# Patient Record
Sex: MALE | Race: WHITE | Employment: FULL TIME | URBAN - METROPOLITAN AREA
[De-identification: names, ages, dates, MRNs, and addresses within clinical notes are randomized per-mention and may not be internally consistent; named-entity substitution may affect disease eponyms.]

---

## 2019-09-18 ENCOUNTER — OFFICE VISIT (OUTPATIENT)
Dept: FAMILY MEDICINE | Facility: CLINIC | Age: 39
End: 2019-09-18
Payer: COMMERCIAL

## 2019-09-18 VITALS
DIASTOLIC BLOOD PRESSURE: 66 MMHG | BODY MASS INDEX: 26.01 KG/M2 | SYSTOLIC BLOOD PRESSURE: 108 MMHG | RESPIRATION RATE: 16 BRPM | WEIGHT: 175.6 LBS | HEART RATE: 75 BPM | HEIGHT: 69 IN | TEMPERATURE: 98.1 F | OXYGEN SATURATION: 98 %

## 2019-09-18 DIAGNOSIS — M25.571 PAIN IN JOINT INVOLVING ANKLE AND FOOT, RIGHT: Primary | ICD-10-CM

## 2019-09-18 DIAGNOSIS — M75.81 TENDINITIS OF RIGHT ROTATOR CUFF: ICD-10-CM

## 2019-09-18 PROCEDURE — 99203 OFFICE O/P NEW LOW 30 MIN: CPT | Performed by: FAMILY MEDICINE

## 2019-09-18 ASSESSMENT — MIFFLIN-ST. JEOR: SCORE: 1713.25

## 2019-09-18 NOTE — PROGRESS NOTES
Subjective     Guy Carrera is a 38 year old male who presents to clinic today for the following health issues:    HPI   Musculoskeletal problem/pain- has had problems with his right shoulder for months after a fall last yr when he landed on that side , he had done some exercises which have helped but recently started to notice pain in that shoulder again , ayla with certain movements , like when raising his right arm up and behind his head .  2) pain and discomfort in the right foot as he slipped a coupe of months ago and injured it , was painful for a while , then got better and now seems that he has pain when trying to stretch that foot . No redness and no edema currently and he is able to bear weight .      Duration: 1 year ongoing    Description  Location: right shoulder    Intensity:  moderate    Accompanying signs and symptoms: patient feels pain, when he puts his hands behind his head, some movement    History  Previous similar problem: no   Previous evaluation:  none    Precipitating or alleviating factors:  Trauma or overuse: YES- fell on his arm, couldn't move it  Aggravating factors include: any movements    Therapies tried and outcome: exercises some relief        There is no problem list on file for this patient.    No past surgical history on file.    Social History     Tobacco Use     Smoking status: Not on file   Substance Use Topics     Alcohol use: Not on file     No family history on file.      No current outpatient medications on file.     No Known Allergies  No lab results found.   BP Readings from Last 3 Encounters:   09/18/19 108/66    Wt Readings from Last 3 Encounters:   09/18/19 79.7 kg (175 lb 9.6 oz)                    Reviewed and updated as needed this visit by Provider         Review of Systems   ROS COMP: Constitutional, HEENT, cardiovascular, pulmonary, GI, , musculoskeletal, neuro, skin, endocrine and psych systems are negative, except as otherwise noted.      Objective     There were no vitals taken for this visit.  There is no height or weight on file to calculate BMI.  Physical Exam   GENERAL: healthy, alert and no distress  EYES: Eyes grossly normal to inspection, PERRL and conjunctivae and sclerae normal  MS: no gross musculoskeletal defects noted, no edema, some tenderness in the right shoulder with abduction and internal rotation , also right foot some pain with plantar flexion but there eis no ankle edema and no erythema,     Diagnostic Test Results:  Labs reviewed in Epic  none         Assessment & Plan     1. Pain in joint involving ankle and foot, right  We discussed doing PT but I have referred him to do them after seeing a sports medicine specialist .  - SPORTS MEDICINE REFERRAL    2. Tendinitis of right rotator cuff  As above  I have referred him to Sports medicine specialist   - SPORTS MEDICINE REFERRAL   ice, rest , NSAIDs as needed .  RTC if no improving or worsening.  Pt is aware  and comfortable with the current plan.      Bethany Thomas MD  [facility]

## 2019-09-18 NOTE — NURSING NOTE
"Chief Complaint   Patient presents with     Musculoskeletal Problem     /66   Pulse 75   Temp 98.1  F (36.7  C) (Oral)   Resp 16   Ht 1.763 m (5' 9.4\")   Wt 79.7 kg (175 lb 9.6 oz)   SpO2 98%   BMI 25.63 kg/m   Estimated body mass index is 25.63 kg/m  as calculated from the following:    Height as of this encounter: 1.763 m (5' 9.4\").    Weight as of this encounter: 79.7 kg (175 lb 9.6 oz).  bp completed using cuff size: regular      Health Maintenance addressed:  NONE    n/a    Veronique Fuentes MA    "

## 2019-09-25 ENCOUNTER — ANCILLARY PROCEDURE (OUTPATIENT)
Dept: GENERAL RADIOLOGY | Facility: CLINIC | Age: 39
End: 2019-09-25
Attending: FAMILY MEDICINE
Payer: COMMERCIAL

## 2019-09-25 ENCOUNTER — OFFICE VISIT (OUTPATIENT)
Dept: ORTHOPEDICS | Facility: CLINIC | Age: 39
End: 2019-09-25
Payer: COMMERCIAL

## 2019-09-25 VITALS
DIASTOLIC BLOOD PRESSURE: 67 MMHG | WEIGHT: 175 LBS | SYSTOLIC BLOOD PRESSURE: 107 MMHG | BODY MASS INDEX: 25.92 KG/M2 | HEIGHT: 69 IN

## 2019-09-25 DIAGNOSIS — S43.001A SHOULDER SUBLUXATION, RIGHT, INITIAL ENCOUNTER: ICD-10-CM

## 2019-09-25 DIAGNOSIS — M25.511 RIGHT SHOULDER PAIN, UNSPECIFIED CHRONICITY: ICD-10-CM

## 2019-09-25 DIAGNOSIS — M25.511 RIGHT SHOULDER PAIN, UNSPECIFIED CHRONICITY: Primary | ICD-10-CM

## 2019-09-25 DIAGNOSIS — S93.411A SPRAIN OF CALCANEOFIBULAR LIGAMENT OF RIGHT ANKLE, INITIAL ENCOUNTER: ICD-10-CM

## 2019-09-25 PROCEDURE — 99204 OFFICE O/P NEW MOD 45 MIN: CPT | Performed by: FAMILY MEDICINE

## 2019-09-25 PROCEDURE — 73030 X-RAY EXAM OF SHOULDER: CPT | Mod: RT

## 2019-09-25 ASSESSMENT — MIFFLIN-ST. JEOR: SCORE: 1710.52

## 2019-09-25 NOTE — PROGRESS NOTES
"[facility]   Clinic Visit s Sep 25, 2019    PCP: Guillermina, Leslye [place]      Guy is a 38 year old male who is seen as self referral for   Chief Complaint   Patient presents with     Right Shoulder - Pain       Injury: Patient describes injury as slipped while working last year 2018. He did some informal shoulder exercises on his own but he has noticed worsening pain and ROM     Right hand dominant    Location of Pain: right shoulder posterior, nonradiating   Duration of Pain: initial injury 1 year ago, worse in the last 1 month(s),   Rating of Pain at worst: 7/10  Rating of Pain Currently: 3/10  Pain is better with: activity avoidance   Pain is worse with: laying down and overhead movement  Treatment so far consists of: home exercises  Associated symptoms: no distal numbness or tingling; denies swelling or warmth  Recent imaging completed: No recent imaging completed.  Prior History of related problems: none    Social History: is employed as a/an , here with     He also notes rolling his ankle about 6 weeks ago at work, inverting foot.  Still has some mild lateral ankle pain.    PMH, PSHX, FMHX, SOCHX all reviewed and are unremarkable or noncontributory to today's visit.  See intake form for details.    Review of Systems   Musculoskeletal: Positive for joint pain.   All other systems reviewed and are negative.        Physical Exam  /67   Ht 1.763 m (5' 9.4\")   Wt 79.4 kg (175 lb)   BMI 25.55 kg/m    Constitutional:well-developed, well-nourished, and in no distress.   Cardiovascular: Intact distal pulses.    Neurological: alert. Gait Normal:   Gait, station, stance, and balance appear normal for age  Skin: Skin is warm and dry.   Psychiatric: Mood and affect normal.   Respiratory: unlabored, speaks in full sentences  Lymph: no LAD, no lymphangitis            Right Ankle Exam     Tenderness   The patient is experiencing tenderness in the " CF.  Swelling: none    Range of Motion   Dorsiflexion: normal   Plantar flexion: normal   Eversion: normal   Inversion: normal     Muscle Strength   Dorsiflexion:  5/5  Plantar flexion:  5/5  Anterior tibial:  5/5  Posterior tibial:  5/5  Gastrocsoleus:  5/5  Peroneal muscle:  5/5    Tests   Anterior drawer: negative  Varus tilt: negative    Other   Erythema: absent  Scars: absent  Sensation: normal  Pulse: present     Comments:  Diminished balance during one legged stand on the affected side.        Right Shoulder Exam     Tenderness   The patient is experiencing no tenderness.    Range of Motion   Active abduction: normal   Passive abduction: normal   Extension: normal   External rotation: normal   Forward flexion: normal   Internal rotation 0 degrees: normal     Muscle Strength   Abduction: 5/5   Internal rotation: 5/5   External rotation: 5/5   Supraspinatus: 5/5   Subscapularis: 5/5   Biceps: 5/5     Tests   Apprehension: negative  Antonio test: positive  Cross arm: negative  Impingement: positive  Drop arm: negative  Sulcus: present    Other   Erythema: absent  Scars: absent  Sensation: normal  Pulse: present    Comments:  Impingement primarily posterior.           X-ray images Ordered and independently reviewed by me in the office today with the patient. X-ray shows:    Shoulder x-rays appear normal, radiology report pending      ASSESSMENT/PLAN    ICD-10-CM    1. Right shoulder pain, unspecified chronicity M25.511 XR Shoulder Right G/E 3 Views     [facility]     CANCELED: [facility]   2. Shoulder subluxation, right, initial encounter S43.001A [facility]     CANCELED: [facility]   3. Sprain of calcaneofibular ligament of right ankle, initial encounter S93.411A [facility]     Posterior shoulder pain and clicking with range of motion suggest labral tear, however he  also has signs of shoulder instability.  Rehab for shoulder stabilization recommended and patient agreed.    More recent ankle inversion injury, unrelated to shoulder issue, suggested ankle brace to which she declined.  He opts for physical therapy for stabilization for this as well.

## 2019-09-25 NOTE — LETTER
"    9/25/2019         RE: Guy Lewis  [address] [facility] [address]        Dear Colleague,    Thank you for referring your patient, Guy Lewis, to the  SPORTS MEDICINE. Please see a copy of my visit note below.    HPI     [facility]   Clinic Visit s Sep 25, 2019    [facility]: [facility]      Guy is a 38 year old male who is seen as self referral for   Chief Complaint   Patient presents with     Right Shoulder - Pain       Injury: Patient describes injury as slipped while working last year 2018. He did some informal shoulder exercises on his own but he has noticed worsening pain and ROM     Right hand dominant    Location of Pain: right shoulder posterior, nonradiating   Duration of Pain: initial injury 1 year ago, worse in the last 1 month(s),   Rating of Pain at worst: 7/10  Rating of Pain Currently: 3/10  Pain is better with: activity avoidance   Pain is worse with: laying down and overhead movement  Treatment so far consists of: home exercises  Associated symptoms: no distal numbness or tingling; denies swelling or warmth  Recent imaging completed: No recent imaging completed.  Prior History of related problems: none    Social History: is employed as a/an , here with     He also notes rolling his ankle about 6 weeks ago at work, inverting foot.  Still has some mild lateral ankle pain.    PMH, PSHX, FMHX, SOCHX all reviewed and are unremarkable or noncontributory to today's visit.  See intake form for details.    Review of Systems   Musculoskeletal: Positive for joint pain.   All other systems reviewed and are negative.        Physical Exam  /67   Ht 1.763 m (5' 9.4\")   Wt 79.4 kg (175 lb)   BMI 25.55 kg/m     Constitutional:well-developed, well-nourished, and in no distress.   Cardiovascular: Intact distal pulses.    Neurological: alert. Gait Normal:   Gait, station, stance, and balance appear normal for " age  Skin: Skin is warm and dry.   Psychiatric: Mood and affect normal.   Respiratory: unlabored, speaks in full sentences  Lymph: no LAD, no lymphangitis            Right Ankle Exam     Tenderness   The patient is experiencing tenderness in the CF.  Swelling: none    Range of Motion   Dorsiflexion: normal   Plantar flexion: normal   Eversion: normal   Inversion: normal     Muscle Strength   Dorsiflexion:  5/5  Plantar flexion:  5/5  Anterior tibial:  5/5  Posterior tibial:  5/5  Gastrocsoleus:  5/5  Peroneal muscle:  5/5    Tests   Anterior drawer: negative  Varus tilt: negative    Other   Erythema: absent  Scars: absent  Sensation: normal  Pulse: present     Comments:  Diminished balance during one legged stand on the affected side.        Right Shoulder Exam     Tenderness   The patient is experiencing no tenderness.    Range of Motion   Active abduction: normal   Passive abduction: normal   Extension: normal   External rotation: normal   Forward flexion: normal   Internal rotation 0 degrees: normal     Muscle Strength   Abduction: 5/5   Internal rotation: 5/5   External rotation: 5/5   Supraspinatus: 5/5   Subscapularis: 5/5   Biceps: 5/5     Tests   Apprehension: negative  Antonio test: positive  Cross arm: negative  Impingement: positive  Drop arm: negative  Sulcus: present    Other   Erythema: absent  Scars: absent  Sensation: normal  Pulse: present    Comments:  Impingement primarily posterior.           X-ray images Ordered and independently reviewed by me in the office today with the patient. X-ray shows:    Shoulder x-rays appear normal, radiology report pending      ASSESSMENT/PLAN    ICD-10-CM    1. Right shoulder pain, unspecified chronicity M25.511 XR Shoulder Right G/E 3 Views     [facility]     CANCELED: [facility]   2. Shoulder subluxation, right, initial encounter S43.001A [facility]     CANCELED: [facility]   3. Sprain of calcaneofibular ligament of right ankle, initial encounter S93.411A [facility]     Posterior shoulder pain and clicking with range of motion suggest labral tear, however he also has signs of shoulder instability.  Rehab for shoulder stabilization recommended and patient agreed.    More recent ankle inversion injury, unrelated to shoulder issue, suggested ankle brace to which she declined.  He opts for physical therapy for stabilization for this as well.    Again, thank you for allowing me to participate in the care of your patient.        Sincerely,        Lb Hopkins MD

## 2019-10-16 ENCOUNTER — THERAPY VISIT (OUTPATIENT)
Dept: PHYSICAL THERAPY | Facility: CLINIC | Age: 39
End: 2019-10-16
Payer: COMMERCIAL

## 2019-10-16 DIAGNOSIS — S93.411A SPRAIN OF CALCANEOFIBULAR LIGAMENT OF RIGHT ANKLE, INITIAL ENCOUNTER: ICD-10-CM

## 2019-10-16 DIAGNOSIS — M25.511 RIGHT SHOULDER PAIN, UNSPECIFIED CHRONICITY: ICD-10-CM

## 2019-10-16 DIAGNOSIS — S43.001A SHOULDER SUBLUXATION, RIGHT, INITIAL ENCOUNTER: ICD-10-CM

## 2019-10-16 PROCEDURE — 97161 PT EVAL LOW COMPLEX 20 MIN: CPT | Mod: GP | Performed by: PHYSICAL THERAPIST

## 2019-10-16 PROCEDURE — 97530 THERAPEUTIC ACTIVITIES: CPT | Mod: GP | Performed by: PHYSICAL THERAPIST

## 2019-10-16 PROCEDURE — 97140 MANUAL THERAPY 1/> REGIONS: CPT | Mod: GP | Performed by: PHYSICAL THERAPIST

## 2019-10-16 NOTE — PROGRESS NOTES
KEY PT FINDINGS:  1) Today's focus was on the shoulder as it was more painful than the ankle  2) Pain at end range of all motion but motion is full  3) Mild pain with resisted abduction + external rotation    Physical Therapy Initial Evaluation: Subjective History     Injury/Condition Details:  Presenting Complaint Right shoulder, right ankle   Onset Timing/Date 1 year ago   Mechanism He fell on a hard surface.   Had pain for 2 weeks, did some self prescribed exercises (light stretches from online). It got better on its own.   The main painful position is to have the hand behind the head.      Symptom Behavior Details    Primary Symptoms Sporadic symptoms; Activity/position dependent, pain (Location: Posterior shoulder pain, Quality: Aching/Throbbing), catching/locking   Worst Pain 8/10 (with reaching behind his head)   Symptom Provocators The position of hand behind his head   Best Pain 0/10    Symptom Relievers Activity modification   Time of day dependent? No   Recent symptom change? no change in symptoms     Prior Testing/Intervention for current condition:  Prior Tests  x-ray   Prior Treatment none     Lifestyle & General Medical History:  Employment [employer] - able to perform his job 100%   Usual physical activities  (within past year) No usual physical activity   Orthopaedic history See Epic Chart   Notable medical history See Epic Chart   Patient Reported Health good       SHOULDER:    Cervical Screen: Full motion, no shoulder pain reported  Posture: Forward head, rounded shoulders    Shoulder:   PROM L PROM R MMT L MMT R   Flex 180 180 Pain     Abd 180 180 Pain Pain/5 Pain/5   IR T12 T12 5/5 5/5   ER 80 80 Pain Pain/5 Pain/5   Mid Trap MMT: 4/5  Low Trap MMT: 4-/5    Palpation: Infraspinatus, teres minor, posterior shoulder musculature    Pain with ER @ 90 degrees of abduction as well as lacking motion - will measure next time.   Negative apprehension testing    Assessment/Plan:  Patient is a 38 year  old male with right side shoulder and right side ankle complaints.    Patient has the following significant findings with corresponding treatment plan.                Diagnosis 1:  Right shoulder, right ankle pain  Pain -  hot/cold therapy, manual therapy, STS, splint/taping/bracing/orthotics, self management and education  Decreased ROM/flexibility - manual therapy, therapeutic exercise and home program  Decreased joint mobility - manual therapy, therapeutic exercise and home program  Decreased strength - therapeutic exercise, therapeutic activities and home program  Impaired muscle performance - neuro re-education and home program  Decreased function - therapeutic activities and home program    Therapy Evaluation Codes:   1) History comprised of:   Personal factors that impact the plan of care:      None.    Comorbidity factors that impact the plan of care are:      None.     Medications impacting care: None.  2) Examination of Body Systems comprised of:   Body structures and functions that impact the plan of care:      Ankle and Shoulder.   Activity limitations that impact the plan of care are:      Dressing and Lifting.  3) Clinical presentation characteristics are:   Stable/Uncomplicated.  4) Decision-Making    Low complexity using standardized patient assessment instrument and/or measureable assessment of functional outcome.  Cumulative Therapy Evaluation is: Low complexity.    Previous and current functional limitations:  (See Goal Flow Sheet for this information)    Short term and Long term goals: (See Goal Flow Sheet for this information)     Communication ability:  Patient has an  for communication clarity.  Treatment Explanation - The following has been discussed with the patient:   RX ordered/plan of care  Anticipated outcomes  Possible risks and side effects  This patient would benefit from PT intervention to resume normal activities.   Rehab potential is good.    Frequency:  1 X week, once  daily  Duration:  for 4 weeks  Discharge Plan:  Achieve all LTG.  Independent in home treatment program.  Reach maximal therapeutic benefit.    Please refer to the daily flowsheet for treatment today, total treatment time and time spent performing 1:1 timed codes.

## 2019-10-17 PROBLEM — S93.411A SPRAIN OF CALCANEOFIBULAR LIGAMENT OF RIGHT ANKLE, INITIAL ENCOUNTER: Status: ACTIVE | Noted: 2019-10-17

## 2019-10-17 PROBLEM — S43.001A SHOULDER SUBLUXATION, RIGHT, INITIAL ENCOUNTER: Status: ACTIVE | Noted: 2019-10-17

## 2019-10-17 PROBLEM — M25.511 RIGHT SHOULDER PAIN, UNSPECIFIED CHRONICITY: Status: ACTIVE | Noted: 2019-10-17

## 2019-11-18 ENCOUNTER — OFFICE VISIT (OUTPATIENT)
Dept: PSYCHOLOGY | Facility: CLINIC | Age: 39
End: 2019-11-18
Payer: COMMERCIAL

## 2019-11-18 ENCOUNTER — DOCUMENTATION ONLY (OUTPATIENT)
Dept: PSYCHOLOGY | Facility: CLINIC | Age: 39
End: 2019-11-18

## 2019-11-18 DIAGNOSIS — F41.9 ANXIETY DISORDER, UNSPECIFIED TYPE: Primary | ICD-10-CM

## 2019-11-18 PROCEDURE — 90839 PSYTX CRISIS INITIAL 60 MIN: CPT | Performed by: SOCIAL WORKER

## 2019-11-18 ASSESSMENT — COLUMBIA-SUICIDE SEVERITY RATING SCALE - C-SSRS
2. HAVE YOU ACTUALLY HAD ANY THOUGHTS OF KILLING YOURSELF LIFETIME?: YES
1. IN THE PAST MONTH, HAVE YOU WISHED YOU WERE DEAD OR WISHED YOU COULD GO TO SLEEP AND NOT WAKE UP?: NO
LETHALITY/MEDICAL DAMAGE CODE MOST RECENT ACTUAL ATTEMPT: NO PHYSICAL DAMAGE OR VERY MINOR PHYSICAL DAMAGE
2. HAVE YOU ACTUALLY HAD ANY THOUGHTS OF KILLING YOURSELF?: NO
REASONS FOR IDEATION LIFETIME: COMPLETELY TO END OR STOP THE PAIN (YOU COULDN'T GO ON LIVING WITH THE PAIN OR HOW YOU WERE FEELING)
5. HAVE YOU STARTED TO WORK OUT OR WORKED OUT THE DETAILS OF HOW TO KILL YOURSELF? DO YOU INTEND TO CARRY OUT THIS PLAN?: NO
TOTAL  NUMBER OF INTERRUPTED ATTEMPTS LIFETIME: NO
LETHALITY/MEDICAL DAMAGE CODE MOST RECENT POTENTIAL ATTEMPT: BEHAVIOR LIKELY TO RESULT IN DEATH DESPITE AVAILABLE MEDICAL CARE
4. HAVE YOU HAD THESE THOUGHTS AND HAD SOME INTENTION OF ACTING ON THEM?: NO
4. HAVE YOU HAD THESE THOUGHTS AND HAD SOME INTENTION OF ACTING ON THEM?: YES
6. HAVE YOU EVER DONE ANYTHING, STARTED TO DO ANYTHING, OR PREPARED TO DO ANYTHING TO END YOUR LIFE?: YES
ATTEMPT LIFETIME: YES
3. HAVE YOU BEEN THINKING ABOUT HOW YOU MIGHT KILL YOURSELF?: YES
5. HAVE YOU STARTED TO WORK OUT OR WORKED OUT THE DETAILS OF HOW TO KILL YOURSELF? DO YOU INTEND TO CARRY OUT THIS PLAN?: YES
TOTAL  NUMBER OF ABORTED OR SELF INTERRUPTED ATTEMPTS PAST 3 MONTHS: NO
TOTAL  NUMBER OF ABORTED OR SELF INTERRUPTED ATTEMPTS PAST LIFETIME: NO
TOTAL  NUMBER OF INTERRUPTED ATTEMPTS PAST 3 MONTHS: NO
1. IN THE PAST MONTH, HAVE YOU WISHED YOU WERE DEAD OR WISHED YOU COULD GO TO SLEEP AND NOT WAKE UP?: YES
ATTEMPT PAST THREE MONTHS: NO
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
## 2019-11-25 ENCOUNTER — TELEPHONE (OUTPATIENT)
Dept: PSYCHOLOGY | Facility: CLINIC | Age: 39
End: 2019-11-25

## 2019-11-25 NOTE — TELEPHONE ENCOUNTER
Called patient with .  Used language line.   did not provide ID number and hung up before providing it.  Patient reported his insurance currently only covers [employer], so he could not go to any of the other [place] speaking therapists.  Will maintain next appointment.

## 2019-11-29 PROBLEM — S93.411A SPRAIN OF CALCANEOFIBULAR LIGAMENT OF RIGHT ANKLE, INITIAL ENCOUNTER: Status: RESOLVED | Noted: 2019-10-17 | Resolved: 2019-11-29

## 2019-11-29 PROBLEM — M25.511 RIGHT SHOULDER PAIN, UNSPECIFIED CHRONICITY: Status: RESOLVED | Noted: 2019-10-17 | Resolved: 2019-11-29

## 2019-11-29 PROBLEM — S43.001A SHOULDER SUBLUXATION, RIGHT, INITIAL ENCOUNTER: Status: RESOLVED | Noted: 2019-10-17 | Resolved: 2019-11-29

## 2019-12-04 ENCOUNTER — OFFICE VISIT (OUTPATIENT)
Dept: PSYCHOLOGY | Facility: CLINIC | Age: 39
End: 2019-12-04
Payer: COMMERCIAL

## 2019-12-04 DIAGNOSIS — F41.9 ANXIETY DISORDER, UNSPECIFIED TYPE: Primary | ICD-10-CM

## 2019-12-04 PROCEDURE — 90785 PSYTX COMPLEX INTERACTIVE: CPT | Performed by: SOCIAL WORKER

## 2019-12-04 PROCEDURE — 90791 PSYCH DIAGNOSTIC EVALUATION: CPT | Performed by: SOCIAL WORKER
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
## 2019-12-05 ASSESSMENT — ANXIETY QUESTIONNAIRES
GAD7 TOTAL SCORE: 4
3. WORRYING TOO MUCH ABOUT DIFFERENT THINGS: SEVERAL DAYS
2. NOT BEING ABLE TO STOP OR CONTROL WORRYING: SEVERAL DAYS
5. BEING SO RESTLESS THAT IT IS HARD TO SIT STILL: NOT AT ALL
4. TROUBLE RELAXING: SEVERAL DAYS
1. FEELING NERVOUS, ANXIOUS, OR ON EDGE: SEVERAL DAYS
6. BECOMING EASILY ANNOYED OR IRRITABLE: NOT AT ALL
IF YOU CHECKED OFF ANY PROBLEMS ON THIS QUESTIONNAIRE, HOW DIFFICULT HAVE THESE PROBLEMS MADE IT FOR YOU TO DO YOUR WORK, TAKE CARE OF THINGS AT HOME, OR GET ALONG WITH OTHER PEOPLE: SOMEWHAT DIFFICULT
7. FEELING AFRAID AS IF SOMETHING AWFUL MIGHT HAPPEN: NOT AT ALL

## 2019-12-05 ASSESSMENT — PATIENT HEALTH QUESTIONNAIRE - PHQ9: SUM OF ALL RESPONSES TO PHQ QUESTIONS 1-9: 4
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
## 2019-12-06 ASSESSMENT — ANXIETY QUESTIONNAIRES: GAD7 TOTAL SCORE: 4

## 2019-12-20 ENCOUNTER — TELEPHONE (OUTPATIENT)
Dept: PSYCHOLOGY | Facility: CLINIC | Age: 39
End: 2019-12-20

## 2019-12-20 NOTE — TELEPHONE ENCOUNTER
Informed Guy that he needed to call to make a doctor's appointment with Dr. Bethany Thomas for memory problems and a possible referral to neurology.

## 2020-01-02 ENCOUNTER — OFFICE VISIT (OUTPATIENT)
Dept: PSYCHOLOGY | Facility: CLINIC | Age: 40
End: 2020-01-02
Payer: COMMERCIAL

## 2020-01-02 DIAGNOSIS — F41.9 ANXIETY DISORDER, UNSPECIFIED TYPE: Primary | ICD-10-CM

## 2020-01-02 PROCEDURE — 90834 PSYTX W PT 45 MINUTES: CPT | Performed by: SOCIAL WORKER

## 2020-01-02 ASSESSMENT — ANXIETY QUESTIONNAIRES
6. BECOMING EASILY ANNOYED OR IRRITABLE: NOT AT ALL
4. TROUBLE RELAXING: NOT AT ALL
7. FEELING AFRAID AS IF SOMETHING AWFUL MIGHT HAPPEN: NOT AT ALL
2. NOT BEING ABLE TO STOP OR CONTROL WORRYING: NOT AT ALL
GAD7 TOTAL SCORE: 2
3. WORRYING TOO MUCH ABOUT DIFFERENT THINGS: SEVERAL DAYS
5. BEING SO RESTLESS THAT IT IS HARD TO SIT STILL: NOT AT ALL
IF YOU CHECKED OFF ANY PROBLEMS ON THIS QUESTIONNAIRE, HOW DIFFICULT HAVE THESE PROBLEMS MADE IT FOR YOU TO DO YOUR WORK, TAKE CARE OF THINGS AT HOME, OR GET ALONG WITH OTHER PEOPLE: SOMEWHAT DIFFICULT
1. FEELING NERVOUS, ANXIOUS, OR ON EDGE: SEVERAL DAYS

## 2020-01-02 ASSESSMENT — PATIENT HEALTH QUESTIONNAIRE - PHQ9: SUM OF ALL RESPONSES TO PHQ QUESTIONS 1-9: 7

## 2020-01-02 NOTE — Clinical Note
Nasima Thomas,I had previously contacted you about my concerns with Guy. He is now scheduled for an appointment with you next week to discuss his concerns.  He is satisfied with his progress with anxiety and panic attacks and wishes to discontinue therapy at this time.  He is welcome to return at any time, so please feel free to encourage him to do so if you have any concerns when you see him next week.Thank you,JONNA Umanzor, [email or web address]

## 2020-01-02 NOTE — PROGRESS NOTES
Progress Note    Patient Name: Guy Lewis  Date: 1/2/20         Service Type: Individual  Video Visit: No     Session Start Time: 3:00 PM  Session End Time: 3:45 PM     Session Length: 45 minutes    Session #: 3    Attendees: Client and  Karen     Treatment Plan Last Reviewed: Created today (1/2/20)  PHQ-9 / DECLAN-7 :   PHQ-9 SCORE 12/5/2019 1/2/2020   PHQ-9 Total Score 4 7     DECLAN-7 SCORE 12/5/2019 1/2/2020   Total Score 4 2         DATA  Interactive Complexity: Yes, visit entailed Interactive Complexity evidenced by:  -Use of play equipment, physical devices,  or  to overcome barriers to diagnostic or therapeutic interaction with a patient who is not fluent in the same language or who has not developed or lost expressive or receptive language skills to use or understand typical language  Crisis: No       Progress Since Last Session (Related to Symptoms / Goals / Homework):   Symptoms: Improving Client reports feeling much better    Homework: N/A, last session was assessment      Episode of Care Goals: Satisfactory progress - ACTION (Actively working towards change); Intervened by reinforcing change plan / affirming steps taken     Current / Ongoing Stressors and Concerns:   Client is an immigrant from [place], where he needed to leave as his Roman [employer] became illegal there. Client does not speak English and states he is discriminated against in the workforce due to this.       Treatment Objective(s) Addressed in This Session:   Patient will reduce frequency of panic attacks and gain coping skills for anxiety.  Patient will see doctor about memory concerns.     Intervention:   Psychoeducation, skills work: Started to create a treatment plan, but client identified that he had already gotten what he needed out of therapy and was feeling better, especially as he had left his job. He finds the new job much more supportive.  Identified  triggers to panic attacks and ways to prevent and slow them.  Coached client on grounding, which he already uses some of when he experiences panic attacks as he goes to get cold water on his hands.  Discussed dealing with the problems that build up with him and addressing them as they occur, either by talking to the person, processing later with his wife, or journaling.  Also discussed the importance of exercise; client was able to connect how this has been helpful to him in the past.  Talked about the importance of the doctor's appointment for looking at memory. Also discussed plans for the future, including returning to therapy as needed.        ASSESSMENT: Current Emotional / Mental Status (status of significant symptoms):   Risk status (Self / Other harm or suicidal ideation)   Patient denies current fears or concerns for personal safety.   Patient denies current or recent suicidal ideation or behaviors.   Patientdenies current or recent homicidal ideation or behaviors.   Patient denies current or recent self injurious behavior or ideation.   Patient denies other safety concerns.   Patient Patient reports there has been no change in risk factors since their last session.     PatientPatient reports there has been no change in protective factors since their last session.     A safety and risk management plan has been developed including: Plan created in past session     Appearance:   Appropriate    Eye Contact:   Good    Psychomotor Behavior: Normal    Attitude:   Cooperative    Orientation:   All   Speech    Rate / Production: Normal     Volume:  Normal    Mood:    Normal   Affect:    Appropriate    Thought Content:  Clear    Thought Form:  Coherent  Logical    Insight:    Fair      Medication Review:   No current psychiatric medications prescribed     Medication Compliance:   NA     Changes in Health Issues:   None reported     Chemical Use Review:   Substance Use: Chemical use reviewed, no active concerns  identified      Tobacco Use: No current tobacco use.      Diagnosis:  1. Anxiety disorder, unspecified type        Collateral Reports Completed:   Routed note to PCP    PLAN: (Patient Tasks / Therapist Tasks / Other)  Discharge client; client knows he can return at any time.  Let PCP know he plans to discharge.        OSCAR GRUPO HARPER                                                         ______________________________________________________________________    Treatment Plan    Patient's Name: Guy Lewis  YOB: 1980    Date: 1/2/20    DSM5 Diagnoses:300.09 (F41.8) Other Specified Anxiety Disorder  R/O medical causes for mental health symptoms  Psychosocial / Contextual Factors: Client is an immigrant from [place], where he needed to leave as his Religious became illegal there. Client does not speak English and states he is discriminated against in the workforce due to this.  WHODAS: 23    Referral / Collaboration:  Was/were discussed and client will pursue.  Referral for eval by primary care doc for the appropriateness of an neurology referral and neuropsychology referral    Anticipated number of session or this episode of care: 12      MeasurableTreatment Goal(s) related to diagnosis / functional impairment(s)  Goal 1: Patient will remain safe, reduce panic attacks, and discover underlying condition affecting these sypmtoms    I will know I've met my goal when I know I can handle everything.      Objective #A (Patient Action)    Patient will utilize safety plan as needed.  Status: New - Date: 1/2/20     Intervention(s)  Therapist will evaluate safety regularly in sessions and review plan as needed.    Objective #B  Patient will reduce frequency of panic attacks and gain coping skills for anxiety.  Status: New - Date: 1/2/20     Intervention(s)  Therapist will teach coping skills and provide psychoeducation on panic attacks.    Objective #C  Patient will see doctor about memory  concerns.  Status: New - Date: 1/2/20     Intervention(s)  Therapist will make referrals to primary care physician.    Patient has not reviewed nor agreed to the above plan.  In discussing the plan, client stated he felt better after leaving his job and would not be returning for therapy.      GRUPO CERNA  January 2, 2020                     Discharge Summary  Multiple Sessions    Client Name: Guy Lewis MRN#: [id number] YOB: 1980    Discharge Date:   January 2, 2020        Focus of Treatment Objective(s):  Client's presenting concerns included: Anxiety - panic attacks, suicidal thoughts, memory problems  Stage of Change at time of Discharge: ACTION (Actively working towards change)    Medication Adherence:  NA    Chemical Use:  No      DSM5 Diagnoses: (Sustained by DSM5 Criteria Listed Above)  Diagnoses: 300.09 (F41.8) Other Specified Anxiety Disorder   Psychosocial & Contextual Factors: Client is an immigrant from [place], where he needed to leave as his Hindu became illegal there. Client does not speak English and states he is discriminated against in the workforce due to this.  WHODAS 2.0 (12 item) Score: 21    Reason for Discharge:  Client is satisfied with progress      Aftercare Plan:  Client may resume counseling services at any time in the future by calling the [facility] Intake Office, [phone].  Client will follow-up with primary care doctor. Referral made by current therapist.      GRUPO CERNA

## 2020-01-03 ASSESSMENT — ANXIETY QUESTIONNAIRES: GAD7 TOTAL SCORE: 2

## 2020-01-07 ENCOUNTER — OFFICE VISIT (OUTPATIENT)
Dept: FAMILY MEDICINE | Facility: CLINIC | Age: 40
End: 2020-01-07
Payer: COMMERCIAL

## 2020-01-07 VITALS
RESPIRATION RATE: 16 BRPM | OXYGEN SATURATION: 98 % | HEIGHT: 70 IN | SYSTOLIC BLOOD PRESSURE: 116 MMHG | HEART RATE: 66 BPM | BODY MASS INDEX: 24.91 KG/M2 | WEIGHT: 174 LBS | DIASTOLIC BLOOD PRESSURE: 70 MMHG | TEMPERATURE: 98 F

## 2020-01-07 DIAGNOSIS — F41.1 GENERALIZED ANXIETY DISORDER: ICD-10-CM

## 2020-01-07 DIAGNOSIS — S09.90XS TRAUMATIC INJURY OF HEAD, SEQUELA: Primary | ICD-10-CM

## 2020-01-07 PROCEDURE — 99214 OFFICE O/P EST MOD 30 MIN: CPT | Performed by: FAMILY MEDICINE

## 2020-01-07 ASSESSMENT — MIFFLIN-ST. JEOR: SCORE: 1706.54

## 2020-01-07 NOTE — PROGRESS NOTES
Subjective     Guy Lewis is a 39 year old male who presents to clinic today for the following health issues:    HPI   1) Consult memory and neurologic issues. He has been seeing a therapist for anxiety since November and states that his anxiety is much better now , but he is concerned about having two major head injuries in the past and also states he has had episodes when he  feels disconnected from the surrounding and also as if he cannot account for time passing, he denies any seizure activities     Sees a therapist for his anxiety , is  with children , he is originally from [place] and [place] living in the [place] the past three years , uses an interpertor for OV.  States that one of head injuries was as a child and one a few yrs ago, currently denies any headaches , no nausea , no vomiting , no numbness , no tingling or any vision changes .    Patient Active Problem List   Diagnosis     Generalized anxiety disorder     Traumatic injury of head, sequela     No past surgical history on file.    Social History     Tobacco Use     Smoking status: Never Smoker     Smokeless tobacco: Never Used   Substance Use Topics     Alcohol use: Not on file     No family history on file.      No current outpatient medications on file.     No Known Allergies  No lab results found.   BP Readings from Last 3 Encounters:   01/07/20 116/70   09/25/19 107/67   09/18/19 108/66    Wt Readings from Last 3 Encounters:   01/07/20 78.9 kg (174 lb)   09/25/19 79.4 kg (175 lb)   09/18/19 79.7 kg (175 lb 9.6 oz)                      Reviewed and updated as needed this visit by Provider         Review of Systems   ROS COMP: Constitutional, HEENT, cardiovascular, pulmonary, GI, , musculoskeletal, neuro, skin, endocrine and psych systems are negative, except as otherwise noted.      Objective    /70 (BP Location: Left arm, Cuff Size: Adult Regular)   Pulse 66   Temp 98  F (36.7  C) (Oral)   Resp 16   Ht 1.772 m (5'  "9.75\")   Wt 78.9 kg (174 lb)   SpO2 98%   BMI 25.15 kg/m    Body mass index is 25.15 kg/m .  Physical Exam   GENERAL: healthy, alert and no distress  EYES: Eyes grossly normal to inspection, PERRL and conjunctivae and sclerae normal  HENT: ear canals and TM's normal, nose and mouth without ulcers or lesions  NECK: no adenopathy, no asymmetry, masses, or scars and thyroid normal to palpation  RESP: lungs clear to auscultation - no rales, rhonchi or wheezes  CV: regular rate and rhythm, normal S1 S2, no S3 or S4, no murmur, click or rub, no peripheral edema and peripheral pulses strong  ABDOMEN: soft, nontender, no hepatosplenomegaly, no masses and bowel sounds normal  MS: no gross musculoskeletal defects noted, no edema  NEURO: Normal strength and tone, mentation intact and speech normal    Diagnostic Test Results:  Labs reviewed in Epic  No results found for any visits on 01/07/20.        Assessment & Plan     1. Traumatic injury of head, sequela  We discussed seeing a neurologist , currently he is asymptomatic but has episodes when he disconnects from the environment and then have time where he cannot say what has happened , could be psychological as a result of stress and anxiety but since he has had two major head injuries in the past we discussed seeing a neurologist first   - NEUROLOGY ADULT REFERRAL    2. Generalized anxiety disorder  He is seeing a therapist now and currently he declined any medications to treat his anxiety        RTC if no improving or worsening.  Pt is aware  and comfortable with the current plan.      Bethany Thomas MD  [facility]        "

## 2020-01-07 NOTE — NURSING NOTE
"Chief Complaint   Patient presents with     Consult     possible referral for a brain scan-bad memory sleep disturbance     initial /70 (BP Location: Left arm, Cuff Size: Adult Regular)   Pulse 66   Temp 98  F (36.7  C) (Oral)   Resp 16   Ht 1.772 m (5' 9.75\")   Wt 78.9 kg (174 lb)   SpO2 98%   BMI 25.15 kg/m   Estimated body mass index is 25.15 kg/m  as calculated from the following:    Height as of this encounter: 1.772 m (5' 9.75\").    Weight as of this encounter: 78.9 kg (174 lb).  BP completed using cuff size: regular.  L  arm      Health Maintenance that is potentially due pending provider review:  NONE    n/a    Ellis Bowden ma  "

## 2020-01-14 PROBLEM — F41.1 GENERALIZED ANXIETY DISORDER: Status: ACTIVE | Noted: 2020-01-14

## 2020-01-14 PROBLEM — S09.90XS TRAUMATIC INJURY OF HEAD, SEQUELA: Status: ACTIVE | Noted: 2020-01-14

## 2020-01-24 ENCOUNTER — OFFICE VISIT (OUTPATIENT)
Dept: PHYSICAL MEDICINE AND REHAB | Facility: CLINIC | Age: 40
End: 2020-01-24
Attending: FAMILY MEDICINE
Payer: COMMERCIAL

## 2020-01-24 VITALS
BODY MASS INDEX: 25.05 KG/M2 | OXYGEN SATURATION: 98 % | HEIGHT: 70 IN | HEART RATE: 76 BPM | WEIGHT: 175 LBS | DIASTOLIC BLOOD PRESSURE: 81 MMHG | SYSTOLIC BLOOD PRESSURE: 122 MMHG

## 2020-01-24 DIAGNOSIS — F03.90 DEMENTIA WITHOUT BEHAVIORAL DISTURBANCE, UNSPECIFIED DEMENTIA TYPE: Primary | ICD-10-CM

## 2020-01-24 DIAGNOSIS — S06.0X0A CONCUSSION WITHOUT LOSS OF CONSCIOUSNESS, INITIAL ENCOUNTER: ICD-10-CM

## 2020-01-24 SDOH — HEALTH STABILITY: MENTAL HEALTH: HOW OFTEN DO YOU HAVE A DRINK CONTAINING ALCOHOL?: MONTHLY OR LESS

## 2020-01-24 ASSESSMENT — ANXIETY QUESTIONNAIRES
7. FEELING AFRAID AS IF SOMETHING AWFUL MIGHT HAPPEN: NOT AT ALL
GAD7 TOTAL SCORE: 4
2. NOT BEING ABLE TO STOP OR CONTROL WORRYING: SEVERAL DAYS
3. WORRYING TOO MUCH ABOUT DIFFERENT THINGS: SEVERAL DAYS
GAD7 TOTAL SCORE: 4
4. TROUBLE RELAXING: SEVERAL DAYS
5. BEING SO RESTLESS THAT IT IS HARD TO SIT STILL: NOT AT ALL
1. FEELING NERVOUS, ANXIOUS, OR ON EDGE: SEVERAL DAYS
7. FEELING AFRAID AS IF SOMETHING AWFUL MIGHT HAPPEN: NOT AT ALL
GAD7 TOTAL SCORE: 4
6. BECOMING EASILY ANNOYED OR IRRITABLE: NOT AT ALL

## 2020-01-24 ASSESSMENT — PATIENT HEALTH QUESTIONNAIRE - PHQ9
10. IF YOU CHECKED OFF ANY PROBLEMS, HOW DIFFICULT HAVE THESE PROBLEMS MADE IT FOR YOU TO DO YOUR WORK, TAKE CARE OF THINGS AT HOME, OR GET ALONG WITH OTHER PEOPLE: VERY DIFFICULT
SUM OF ALL RESPONSES TO PHQ QUESTIONS 1-9: 6
SUM OF ALL RESPONSES TO PHQ QUESTIONS 1-9: 6

## 2020-01-24 ASSESSMENT — MIFFLIN-ST. JEOR: SCORE: 1707.1

## 2020-01-24 ASSESSMENT — PAIN SCALES - GENERAL: PAINLEVEL: NO PAIN (0)

## 2020-01-24 NOTE — PROGRESS NOTES
"CONCUSSION CLINIC    Date of Service: 2020  Patient Name: Guy Lewis   : 1980   Medical Record: [id number]     History of Present Illness   Guy Lewis is a 39 year old male who has a history of two significant brain injuries who is referred to clinic for cognitive difficulties that have been more pronounced over the last two years. He is concerned that his cognitive issues may be related to his extensive concussion history, and is seeking further evaluation.     Injury Date / Mechanism of Injury: Most recent head injury occurred at age 18 in [place]. He was walking down stairs carrying a heavy speaker, slipped and fell onto the back of head. He had immediate bleeding from the ear. Friends report that he was unresponsive for 7-8 seconds. Does not believe he had any nausea, vomiting, headaches, vision problems, mood disturbances or other post-concussive symptoms following injury. Did not seek any formal medical evaluation or treatment.     History of previous brain injury: Had a previous brain injury at age 12. Was standing on ladder, fell down and hit chin on window sill. Does not believe he lost consciousness. He had some nausea and vomiting for a while thereafter.     Current Physical Symptoms   The patient immigrated to [place] from [place] two years ago, and has noticed current symptoms since then. Memory is significantly impaired, and there has been a decline over the last two years. He has difficulty with short term memory. Long term memory is intact. Other cognitive symptoms are most noticeable with driving. For example, he has GPS navigation in his car, and continues to drive despite instructions from GPS. \"It's like I'm on auto . I am constantly zoning out.\" He is worried about this current \"mindset.\" On a scale of 0-10/10, he rates his typical stress level at a 5/10. There     Denies headaches, nausea, vomiting, or vertigo. He was having headaches on a regular basis " "when he first moved to the [place], but these have improved. His vision tends to become \"foggy\" after long periods of focusing, improves with sleep. At night, sleeps much better, but was sleeping very poorly a few months ago.       Review of Systems   A complete review of systems was addressed with the patient during the visit.   Pertinent positives are included above, all other review of systems were negative.     Medications   No current outpatient medications on file.       Allergies   Allergen Reactions     No Clinical Screening - See Comments Rash     Sugar causes rash       Past Medical History   No past medical history on file.       Social History   Patient is  and has four kids ages 13, 11, 5 and 3. Works full-time in [employer].     When he was 7 years old, he was told that he needed to be in \"special school\" because he was behind in development. Despite this, he was always a straight A student. He has struggled throughout his life with developing interpersonal relationships. Was given the diagnosis of \"mixed disturbance of personality\" around age 25 and was told this was a lifetime diagnosis and it was \"non-treatable.\" He struggled with concentration when he was studying in University - he states that he would \"lose concentration\" or miss numbers/letters, which would cause him to read things inaccurately. Most of the teachers that understood his issues, would \"dismiss\" his mistakes and  him less harshly, which allowed him to do relatively well in school.       Physical Exam   /81   Pulse 76   Ht 1.765 m (5' 9.5\")   Wt 79.4 kg (175 lb)   SpO2 98%   BMI 25.47 kg/m     Constitutional: No acute distress, alert, cooperative, pleasant   Eyes: Sclera non-icteric, conjunctivae non-injected, visual fields grossly intact   ENT/Mouth: No apparent external trauma of ears and nose, hearing grossly intact to conversational speech, moist mucous membranes, no rhinorrhea   Respiratory: " Breathing comfortably on room air   Cardiovascular: No cyanosis, extremities without edema   Neurologic: Speech clear and appropriate. Good eye contact.   Strength and fine motor coordination grossly intact to bilateral upper and lower limbs.   Sensation intact to light touch in bilateral upper and lower limbs.   CN 2-12 grossly intact       Imaging:   None       Assessment / Plan:   Guy Lewis is a 39 year old male who has a history of two significant brain injuries over 20 years ago who is referred to clinic for evaluation of cognitive difficulties that have been more pronounced over the last two years. Based on his description, it certainly seems that his previous head injury at age 18 was moderate in terms of severity, but he did not have any obvious post-concussive symptoms thereafter. Given that his head injuries were long ago, and he has been relatively asymptomatic up until two years ago without any additional inciting event, it is highly unlikely that his current symptoms are sequelae of concussion.     Of note, patient does have a history of learning disabilities that appear to be consistent with dyslexia and possible ADHD. Currently, he is experiencing a significant amount of stress with recent move from [place] to the  with four young children, not speaking the English language, etc. My suspicion is that current symptoms are more of a stress response than anything else.     To be thorough in his workup, I would like him to be further evaluated by Neurology. I would also like him to have a formal Neuropsychological evaluation, as this will give us much more information about his cognition, memory, and mood. These results will help further direct care of Guy.       Patient indicated understanding and agreement with the plan   Patient was provided with the number for our outpatient clinic for any additional questions or concerns   Follow up: As needed    I spent a total of 60 minutes  face-to-face and managing the care of Guy Lewis. Over 50% of my time was spent counseling the patient and coordinating care. Please see note for details.

## 2020-01-24 NOTE — LETTER
2020       RE: Guy Lewis  [address]  [facility] [address]     Dear Colleague,    Thank you for referring your patient, Guy Lewis, to the [facility] at [facility]. Please see a copy of my visit note below.    CONCUSSION CLINIC    Date of Service: 2020  Patient Name: Guy Lewis   : 1980   Medical Record: [id number]     History of Present Illness   Guy Lewis is a 39 year old male who has a history of two significant brain injuries who is referred to clinic for cognitive difficulties that have been more pronounced over the last two years. He is concerned that his cognitive issues may be related to his extensive concussion history, and is seeking further evaluation.     Injury Date / Mechanism of Injury: Most recent head injury occurred at age 18 in [place]. He was walking down stairs carrying a heavy speaker, slipped and fell onto the back of head. He had immediate bleeding from the ear. Friends report that he was unresponsive for 7-8 seconds. Does not believe he had any nausea, vomiting, headaches, vision problems, mood disturbances or other post-concussive symptoms following injury. Did not seek any formal medical evaluation or treatment.     History of previous brain injury: Had a previous brain injury at age 12. Was standing on ladder, fell down and hit chin on window sill. Does not believe he lost consciousness. He had some nausea and vomiting for a while thereafter.     Current Physical Symptoms   The patient immigrated to [place] from [place] two years ago, and has noticed current symptoms since then. Memory is significantly impaired, and there has been a decline over the last two years. He has difficulty with short term memory. Long term memory is intact. Other cognitive symptoms are most noticeable with driving. For example, he has GPS navigation in his car, and continues  "to drive despite instructions from GPS. \"It's like I'm on auto . I am constantly zoning out.\" He is worried about this current \"mindset.\" On a scale of 0-10/10, he rates his typical stress level at a 5/10. There     Denies headaches, nausea, vomiting, or vertigo. He was having headaches on a regular basis when he first moved to the [place], but these have improved. His vision tends to become \"foggy\" after long periods of focusing, improves with sleep. At night, sleeps much better, but was sleeping very poorly a few months ago.     Review of Systems   A complete review of systems was addressed with the patient during the visit.   Pertinent positives are included above, all other review of systems were negative.     Medications   No current outpatient medications on file.       Allergies   Allergen Reactions     No Clinical Screening - See Comments Rash     Sugar causes rash       Past Medical History   No past medical history on file.     Social History   Patient is  and has four kids ages 13, 11, 5 and 3. Works full-time in [employer].     When he was 7 years old, he was told that he needed to be in \"special school\" because he was behind in development. Despite this, he was always a straight A student. He has struggled throughout his life with developing interpersonal relationships. Was given the diagnosis of \"mixed disturbance of personality\" around age 25 and was told this was a lifetime diagnosis and it was \"non-treatable.\" He struggled with concentration when he was studying in University - he states that he would \"lose concentration\" or miss numbers/letters, which would cause him to read things inaccurately. Most of the teachers that understood his issues, would \"dismiss\" his mistakes and  him less harshly, which allowed him to do relatively well in school.     Physical Exam   /81   Pulse 76   Ht 1.765 m (5' 9.5\")   Wt 79.4 kg (175 lb)   SpO2 98%   BMI 25.47 kg/m    "   Constitutional: No acute distress, alert, cooperative, pleasant   Eyes: Sclera non-icteric, conjunctivae non-injected, visual fields grossly intact   ENT/Mouth: No apparent external trauma of ears and nose, hearing grossly intact to conversational speech, moist mucous membranes, no rhinorrhea   Respiratory: Breathing comfortably on room air   Cardiovascular: No cyanosis, extremities without edema   Neurologic: Speech clear and appropriate. Good eye contact.   Strength and fine motor coordination grossly intact to bilateral upper and lower limbs.   Sensation intact to light touch in bilateral upper and lower limbs.   CN 2-12 grossly intact     Imaging:   None     Assessment / Plan:   Guy Lewis is a 39 year old male who has a history of two significant brain injuries over 20 years ago who is referred to clinic for evaluation of cognitive difficulties that have been more pronounced over the last two years. Based on his description, it certainly seems that his previous head injury at age 18 was moderate in terms of severity, but he did not have any obvious post-concussive symptoms thereafter. Given that his head injuries were long ago, and he has been relatively asymptomatic up until two years ago without any additional inciting event, it is highly unlikely that his current symptoms are sequelae of concussion.     Of note, patient does have a history of learning disabilities that appear to be consistent with dyslexia and possible ADHD. Currently, he is experiencing a significant amount of stress with recent move from [place] to the  with four young children, not speaking the English language, etc. My suspicion is that current symptoms are more of a stress response than anything else.     To be thorough in his workup, I would like him to be further evaluated by Neurology. I would also like him to have a formal Neuropsychological evaluation, as this will give us much more information about his cognition,  memory, and mood. These results will help further direct care of Guy.     Patient indicated understanding and agreement with the plan   Patient was provided with the number for our outpatient clinic for any additional questions or concerns   Follow up: As needed    I spent a total of 60 minutes face-to-face and managing the care of Guy Lewis. Over 50% of my time was spent counseling the patient and coordinating care. Please see note for details.     Again, thank you for allowing me to participate in the care of your patient.      Sincerely,    Sharon Basilio MD

## 2020-01-24 NOTE — NURSING NOTE
"Chief Complaint   Patient presents with     Head Injury     head injury w/loc and bleeding from ear 12 years ago never seeked medical care- Memory issue last 2 years       Vitals:    01/24/20 0718   BP: 122/81   Pulse: 76   SpO2: 98%   Weight: 79.4 kg (175 lb)   Height: 1.765 m (5' 9.5\")       Body mass index is 25.47 kg/m .    DECLAN-7 SCORE 12/5/2019 1/2/2020 1/24/2020   Total Score - - 4 (minimal anxiety)   Total Score 4 2 4     PHQ-9 SCORE 12/5/2019 1/2/2020 1/24/2020   PHQ-9 Total Score MyChart - - 6 (Mild depression)   PHQ-9 Total Score 4 7 6     CONCUSSION SYMPTOMS ASSESSMENT 1/24/2020   Headache or Pressure In Head 0 - none   Upset Stomach or Throwing Up 0 - none   Problems with Balance 2 - mild to moderate   Feeling Dizzy 2 - mild to moderate   Sensitivity to Light 0 - none   Sensitivity to Noise 2 - mild to moderate   Mood Changes 2 - mild to moderate   Feeling sluggish, hazy, or foggy 1 - mild   Trouble Concentrating, Lack of Focus 4 - moderate to severe   Motion Sickness 0 - none   Vision Changes 1 - mild   Memory Problems 5 - severe   Feeling Confused 2 - mild to moderate   Neck Pain 0 - none   Trouble Sleeping 1 - mild   Total Number of Symptoms 10   Symptom Severity Score 22                         "

## 2020-01-25 ASSESSMENT — ANXIETY QUESTIONNAIRES: GAD7 TOTAL SCORE: 4

## 2020-01-25 ASSESSMENT — PATIENT HEALTH QUESTIONNAIRE - PHQ9: SUM OF ALL RESPONSES TO PHQ QUESTIONS 1-9: 6

## 2020-01-28 ENCOUNTER — PRE VISIT (OUTPATIENT)
Dept: NEUROLOGY | Facility: CLINIC | Age: 40
End: 2020-01-28

## 2020-01-28 NOTE — TELEPHONE ENCOUNTER
FUTURE VISIT INFORMATION      FUTURE VISIT INFORMATION:    Date: 2/14/2020    Time: 830AM    Location: [facility]  REFERRAL INFORMATION:    Referring provider:  Dr. Basilio-1/24/2020    Referring providers clinic:  [facility]@     Reason for visit/diagnosis  Dementia     RECORDS REQUESTED FROM:       Clinic name Comments Records Status Imaging Status    * No Records in Care Everywhere*

## 2020-02-14 ENCOUNTER — OFFICE VISIT (OUTPATIENT)
Dept: NEUROLOGY | Facility: CLINIC | Age: 40
End: 2020-02-14
Attending: PHYSICAL MEDICINE & REHABILITATION
Payer: COMMERCIAL

## 2020-02-14 VITALS
SYSTOLIC BLOOD PRESSURE: 108 MMHG | WEIGHT: 177 LBS | HEART RATE: 83 BPM | DIASTOLIC BLOOD PRESSURE: 71 MMHG | OXYGEN SATURATION: 96 % | BODY MASS INDEX: 25.76 KG/M2

## 2020-02-14 DIAGNOSIS — R25.1 SPELLS OF TREMBLING: Primary | ICD-10-CM

## 2020-02-14 RX ORDER — DIAZEPAM 5 MG
5 TABLET ORAL SEE ADMIN INSTRUCTIONS
Qty: 2 TABLET | Refills: 0 | Status: SHIPPED | OUTPATIENT
Start: 2020-02-14

## 2020-02-14 ASSESSMENT — PAIN SCALES - GENERAL: PAINLEVEL: NO PAIN (0)

## 2020-02-14 NOTE — PROGRESS NOTES
"Service Date: 02/14/2020      REASON FOR VISIT:  The patient is a 39-year-old right-handed man seen for evaluation of spells of shaking.  He is here with the , Jocelin.  He has a somewhat complicated history.  He reports that he gets nervous around people and his hands will shake.  He \"feels out of it.\"  He may get hysterical.  He will try and find a safe soft place to lie down.  The episode may last 5-10 minutes.  He reports the episodes occur infrequently.  He had 1 such episode last year.  If he feels the symptoms coming on, he will take an herbal remedy.  He says that his \"body is exhausted and needs to rest.\"  He has been seeing psychology about this and also saw Physiatry.  From Psychology, he has a diagnosis of anxiety.  There was a concern about panic attacks.  There are also stressors.  The patient is an immigrant from [place].  He does not speak English, although he can read some English and also write.  He feels there are problems on his job because of the language barrier.  He had 1 job and then he quit that job.  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  He usually takes a day off to help with his stress.  If he works the 30 hours a week instead of 40 he says the stress is less and he can handle it.  He lives with his wife and 4 children.  He says that they are getting by.  His wife does not work outside the home.  There is a question about his memory, although he did not raise it.  I noticed that from review of the notes.  He does not have headache or problems with vision or hearing, speech or swallowing.  He does not have focal symptoms in the arms or legs.  He does not have issues controlling his bowel or bladder.  His sleep is normal.  His diet is good.      PAST MEDICAL HISTORY:  Largely noncontributory.  He does not have high blood pressure, diabetes, thyroid or asthma.  He has not had pertinent surgery to the head or neck, but has had at least a couple of " head injuries, one involving a brief loss of consciousness.  He does not drink or smoke.      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        FAMILY HISTORY:  Positive for diabetes.      PHYSICAL EXAMINATION:   GENERAL:  The patient is cooperative and in no distress.   VITAL SIGNS:  His blood pressure is 108/71.  There are no carotid bruits.  Auscultation of the heart shows S1 and S2.   NEUROLOGIC:  The patient is alert, oriented and lucid.  He scored a 28/28 on a modified bedside cognitive assessment.  He recalled 3 of 3 words at 3 minutes.  Cranial nerve testing shows full visual fields to confrontation.  Funduscopic exam shows sharp discs bilaterally.  Eye movements are complete and conjugate without nystagmus.  Pupils react to light.  Facial sensation is normal.  Face moves symmetrically.  Palate elevates in the midline.  Tongue protrudes in the midline.  Motor evaluation shows no pronator drift, normal finger tapping, finger-nose-finger and heel-knee-shin.  He has good strength in the arms and legs.  Muscle stretch reflexes are present and symmetric.  Toes are downgoing.  Sensory exam shows preserved vibration and temperature.  Romberg sign is absent.  He can walk on his heels, toes and tandem without difficulty.      ASSESSMENT:   1.  Spells of uncontrolled shaking.   2.  Concerns about memory.   3.  History of anxiety.      DISCUSSION:  The patient is seen with the above problem list.  With regard to the spells of uncontrolled shaking, I suspect this is more of a panic disorder or anxiety and not seizure or some other neurologic phenomenon.  I am going to obtain an MRI scan of the brain because of his history of closed head injury and also obtain a 3-hour video EEG to try and better get an idea of the nature of these spells if possible.  Because of the language barrier, he would not be a candidate for neuropsych testing.  I am not prescribing any medication.  I will see him in  followup in the next few weeks to review results when available.      Fred Dhillon MD      cc:   Olga Dunbar   [facility]         FRED DHILLON MD             D: 2020   T: 2020   MT: AKA      Name:     MERLE ALSTON   MRN:      [id number]        Account:      [id number]   :      1980           Service Date: 2020      Document: [id number]

## 2020-02-14 NOTE — LETTER
"     RE: Guy Lewis  [address]  [facility] [address]     Dear Colleague,    Thank you for referring your patient, Guy Lewis, to the [facility] at [facility]. Please see a copy of my visit note below.    Service Date: 02/14/2020      REASON FOR VISIT:  The patient is a 39-year-old right-handed man seen for evaluation of spells of shaking.  He is here with the , Jocelin.  He has a somewhat complicated history.  He reports that he gets nervous around people and his hands will shake.  He \"feels out of it.\"  He may get hysterical.  He will try and find a safe soft place to lie down.  The episode may last 5-10 minutes.  He reports the episodes occur infrequently.  He had 1 such episode last year.  If he feels the symptoms coming on, he will take an herbal remedy.  He says that his \"body is exhausted and needs to rest.\"  He has been seeing psychology about this and also saw Physiatry.  From Psychology, he has a diagnosis of anxiety.  There was a concern about panic attacks.  There are also stressors.  The patient is an immigrant from [place].  He does not speak English, although he can read some English and also write.  He feels there are problems on his job because of the language barrier.  He had 1 job and then he quit that job.  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  He usually takes a day off to help with his stress.  If he works the 30 hours a week instead of 40 he says the stress is less and he can handle it.  He lives with his wife and 4 children.  He says that they are getting by.  His wife does not work outside the home.  There is a question about his memory, although he did not raise it.  I noticed that from review of the notes.  He does not have headache or problems with vision or hearing, speech or swallowing.  He does not have focal symptoms in the arms or legs.  He does not have issues controlling " his bowel or bladder.  His sleep is normal.  His diet is good.      PAST MEDICAL HISTORY:  Largely noncontributory.  He does not have high blood pressure, diabetes, thyroid or asthma.  He has not had pertinent surgery to the head or neck, but has had at least a couple of head injuries, one involving a brief loss of consciousness.  He does not drink or smoke.      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        FAMILY HISTORY:  Positive for diabetes.      PHYSICAL EXAMINATION:   GENERAL:  The patient is cooperative and in no distress.   VITAL SIGNS:  His blood pressure is 108/71.  There are no carotid bruits.  Auscultation of the heart shows S1 and S2.   NEUROLOGIC:  The patient is alert, oriented and lucid.  He scored a 28/28 on a modified bedside cognitive assessment.  He recalled 3 of 3 words at 3 minutes.  Cranial nerve testing shows full visual fields to confrontation.  Funduscopic exam shows sharp discs bilaterally.  Eye movements are complete and conjugate without nystagmus.  Pupils react to light.  Facial sensation is normal.  Face moves symmetrically.  Palate elevates in the midline.  Tongue protrudes in the midline.  Motor evaluation shows no pronator drift, normal finger tapping, finger-nose-finger and heel-knee-shin.  He has good strength in the arms and legs.  Muscle stretch reflexes are present and symmetric.  Toes are downgoing.  Sensory exam shows preserved vibration and temperature.  Romberg sign is absent.  He can walk on his heels, toes and tandem without difficulty.      ASSESSMENT:   1.  Spells of uncontrolled shaking.   2.  Concerns about memory.   3.  History of anxiety.      DISCUSSION:  The patient is seen with the above problem list.  With regard to the spells of uncontrolled shaking, I suspect this is more of a panic disorder or anxiety and not seizure or some other neurologic phenomenon.  I am going to obtain an MRI scan of the brain because of his history of  closed head injury and also obtain a 3-hour video EEG to try and better get an idea of the nature of these spells if possible.  Because of the language barrier, he would not be a candidate for neuropsych testing.  I am not prescribing any medication.  I will see him in followup in the next few weeks to review results when available.      Claued Dhillon MD      cc:   Olga Dunbar   Psychology      D: 2020   T: 2020   MT: AKA      Name:     MERLE ALSTON   MRN:      [id number]        Account:      [id number]   :      1980           Service Date: 2020      Document: [id number]

## 2020-02-14 NOTE — NURSING NOTE
Chief Complaint   Patient presents with     Consult     [facility] NEW - DEMENTIA WITHOUT BEHAVIORAL DISTURBANCE, UNSPECIFIED DEMENTIA TYPE       Joseph Pratt, EMT

## 2020-02-19 ENCOUNTER — ANCILLARY PROCEDURE (OUTPATIENT)
Dept: MRI IMAGING | Facility: CLINIC | Age: 40
End: 2020-02-19
Attending: PSYCHIATRY & NEUROLOGY
Payer: COMMERCIAL

## 2020-02-19 DIAGNOSIS — R25.1 SPELLS OF TREMBLING: ICD-10-CM

## 2020-02-19 RX ORDER — GADOBUTROL 604.72 MG/ML
7.5 INJECTION INTRAVENOUS ONCE
Status: COMPLETED | OUTPATIENT
Start: 2020-02-19 | End: 2020-02-19

## 2020-02-19 RX ADMIN — GADOBUTROL 7.5 ML: 604.72 INJECTION INTRAVENOUS at 19:30

## 2020-02-20 NOTE — DISCHARGE INSTRUCTIONS
MRI Contrast Discharge Instructions    The IV contrast you received today will pass out of your body in your  urine. This will happen in the next 24 hours. You will not feel this process.  Your urine will not change color.    Drink at least 4 extra glasses of water or juice today (unless your doctor  has restricted your fluids). This reduces the stress on your kidneys.  You may take your regular medicines.    If you are on dialysis: It is best to have dialysis today.    If you have a reaction: Most reactions happen right away. If you have  any new symptoms after leaving the hospital (such as hives or swelling),  call your hospital at the correct number below. Or call your family doctor.  If you have breathing distress or wheezing, call 911.    Special instructions: ***    I have read and understand the above information.    Signature:______________________________________ Date:___________    Staff:__________________________________________ Date:___________     Time:__________    [facility] Departments:    ___[place]: [phone]  ___[facility]: [phone]  ___[place]: [phone] ___[facility]: [phone]  ___[facility]: [phone]  ___[facility]: [phone]  ___[phone]  ___[facility]: [phone]  ___[phone]

## 2020-02-25 ENCOUNTER — TELEPHONE (OUTPATIENT)
Dept: NEUROLOGY | Facility: CLINIC | Age: 40
End: 2020-02-25

## 2020-02-25 NOTE — TELEPHONE ENCOUNTER
I called pt number in chart with help of Gabonese  to try to update pt on normal MRI result per Dr. Dhillon. When we called the number we were told it was not correct. No other number available to try. Pt has follow up with Dr. Dhillon on 3/27.    Jocy BASS

## 2020-03-02 ENCOUNTER — ALLIED HEALTH/NURSE VISIT (OUTPATIENT)
Dept: NEUROLOGY | Facility: CLINIC | Age: 40
End: 2020-03-02
Payer: COMMERCIAL

## 2020-03-02 DIAGNOSIS — R25.1 SPELLS OF TREMBLING: ICD-10-CM

## 2020-03-03 NOTE — PROGRESS NOTES
CPT 30019/84369  27 Electrodes Placed  OP/3hr Video EEG/Continuous Monitor  [facility]  Dr. Chas betts

## 2020-03-07 NOTE — PROCEDURES
Procedure Date: 2020      EEG #:  SL       DATE OF RECORDIN2020      DATE OF STUDY:  3-hour EEG.      PATIENT INFORMATION:  A 39-year-old male who presents with episodes of shaking.  EEG is being done to evaluate for seizures.      MEDICATIONS:     Valium as needed.      TECHNICAL SUMMARY: This video EEG monitoring procedure was performed with 23 scalp electrodes in 10-20 system placements, and additional scalp, precordial and other surface electrodes used for electrical referencing and artifact detection. Video was reviewed intermittently by EEG technologist and physician for electroclinical seizures.     BACKGROUND ACTIVITY:  During wakefulness, the background activity consists of synchronous and symmetric, well modulated, 8-9 Hz posterior dominant rhythm. The posterior dominant rhythm attenuated with eye opening. During drowsiness, the background activity waxed and waned and there were periods of slowing and attenuation of the posterior alpha rhythm. Stage I sleep and Stage II sleep was recorded in which synchronous and symmetrical vertex waves , K-complexes and sleep spindles  were identified.  No focal abnormalities were observed. In sleep, patient does have focal left temporal lobe, sharply contoured theta activity.  Good examples of these are at 09:45:04, 09:50:18, but again this is only seen in sleep 11:12:25.  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      ACTIVATION PROCEDURES:  Photic stimulation and hyperventilation is completed with no significant abnormalities.      EPILEPTIFORM DISCHARGES:  None.      ICTAL:  None.      IMPRESSION:  Awake and sleep 3-hour video EEG is within normal limits.  No electrographic seizures or epileptiform discharges seen.         DELLA HIGH MD             D: 2020   T: 2020   MT: CATHY      Name:     MERLE ALSTON   MRN:      [id number]        Account:        [id number]   :      1980            Procedure Date: 03/02/2020      Document: [id number]

## 2020-03-11 ENCOUNTER — HEALTH MAINTENANCE LETTER (OUTPATIENT)
Age: 40
End: 2020-03-11

## 2020-03-27 ENCOUNTER — VIRTUAL VISIT (OUTPATIENT)
Dept: NEUROLOGY | Facility: CLINIC | Age: 40
End: 2020-03-27
Payer: COMMERCIAL

## 2020-03-27 DIAGNOSIS — R25.1 EPISODE OF SHAKING: Primary | ICD-10-CM

## 2020-03-27 NOTE — PROGRESS NOTES
TELEPHONE FOLLOWUP VISIT    This is a telephone followup visit with the patient with a [place] .  I initially saw the patient about 6 weeks ago.  At that time, he was describing spells of uncontrollable shaking as well as concerns about memory.  His presentation suggested more anxiety or panic disorder and not a neurologic disorder.  He did have an MRI scan of the brain performed.  I reviewed these results with the patient today by telephone.  The study was unremarkable, and there was no abnormality identified.  In addition, the patient had a 3-hour video EEG that was basically normal showing no epileptic activity.    He does report that during the EEG he had some sort of shaking episode.  I did reassure him that no seizure was identified on the record.  He continues to have concerns.  He recognizes that stress is an issue and is trying to understand it better.  He understands that he needs to keep from getting overtired, and that is a contributing factor.  He is not depressed or suicidal.  He has seen a counselor and may start seeing a counselor again.  He is trying to work through his issues on his own right now to understand it better.  He thinks that he is more sensitive than the average person and that might be the cause of some of these symptoms.    No examination findings.    ASSESSMENT:  1.  Episodes of shaking.  2.  History of anxiety.    DISCUSSION:  This patient had a followup visit to discuss his episodes of shaking.  I think most of the problem relates to anxiety and stress.  He is in agreement on that point.  He is coming to a better understanding of it.  His main reason for consultation was to make sure there was not a neurologic explanation for these symptoms.  I reassured him on his negative workup to date.  He will follow up with me on an as-needed basis.    This phone consultation began at 8:32 and concluded at 8:47.      Claude Dhillon MD    cc:  JONNA Skelton, [facility]  [place]  [address] [place]  [address]

## 2020-03-27 NOTE — PROGRESS NOTES
"Guy Lewis is a 39 year old male who is being evaluated via a billable telephone visit.      The patient has been notified of following:     \"This telephone visit will be conducted via a call between you and your physician/provider. We have found that certain health care needs can be provided without the need for a physical exam.  This service lets us provide the care you need with a short phone conversation.  If a prescription is necessary we can send it directly to your pharmacy.  If lab work is needed we can place an order for that and you can then stop by our lab to have the test done at a later time.    If during the course of the call the physician/provider feels a telephone visit is not appropriate, you will not be charged for this service.\"     Guy Lewis complains of    Chief Complaint   Patient presents with     RECHECK     [facility] RETURN - FOLLOW UP TELEPHONE VISIT       I have reviewed and updated the patient's Allergies and Medication List.    ALLERGIES  No clinical screening - see comments    Joseph Pratt, EMT  "

## 2021-01-03 ENCOUNTER — HEALTH MAINTENANCE LETTER (OUTPATIENT)
Age: 41
End: 2021-01-03

## 2021-04-25 ENCOUNTER — HEALTH MAINTENANCE LETTER (OUTPATIENT)
Age: 41
End: 2021-04-25

## 2021-10-10 ENCOUNTER — HEALTH MAINTENANCE LETTER (OUTPATIENT)
Age: 41
End: 2021-10-10

## 2022-05-22 ENCOUNTER — HEALTH MAINTENANCE LETTER (OUTPATIENT)
Age: 42
End: 2022-05-22

## 2022-08-22 ENCOUNTER — HOSPITAL ENCOUNTER (EMERGENCY)
Age: 42
Discharge: LEFT BEFORE DISCHARGE | End: 2022-08-22

## 2022-08-22 VITALS
SYSTOLIC BLOOD PRESSURE: 112 MMHG | DIASTOLIC BLOOD PRESSURE: 73 MMHG | HEART RATE: 83 BPM | RESPIRATION RATE: 16 BRPM | TEMPERATURE: 97.7 F | OXYGEN SATURATION: 96 %

## 2022-09-18 ENCOUNTER — HEALTH MAINTENANCE LETTER (OUTPATIENT)
Age: 42
End: 2022-09-18

## 2023-06-04 ENCOUNTER — HEALTH MAINTENANCE LETTER (OUTPATIENT)
Age: 43
End: 2023-06-04